# Patient Record
Sex: FEMALE | Race: WHITE | NOT HISPANIC OR LATINO
[De-identification: names, ages, dates, MRNs, and addresses within clinical notes are randomized per-mention and may not be internally consistent; named-entity substitution may affect disease eponyms.]

---

## 2017-07-24 ENCOUNTER — APPOINTMENT (OUTPATIENT)
Dept: DERMATOLOGY | Facility: CLINIC | Age: 57
End: 2017-07-24

## 2017-07-24 VITALS — SYSTOLIC BLOOD PRESSURE: 110 MMHG | DIASTOLIC BLOOD PRESSURE: 76 MMHG

## 2017-07-24 DIAGNOSIS — L82.1 OTHER SEBORRHEIC KERATOSIS: ICD-10-CM

## 2017-07-24 DIAGNOSIS — Z12.83 ENCOUNTER FOR SCREENING FOR MALIGNANT NEOPLASM OF SKIN: ICD-10-CM

## 2017-07-24 DIAGNOSIS — L57.0 ACTINIC KERATOSIS: ICD-10-CM

## 2017-07-24 DIAGNOSIS — L30.0 NUMMULAR DERMATITIS: ICD-10-CM

## 2017-07-24 DIAGNOSIS — L81.4 OTHER MELANIN HYPERPIGMENTATION: ICD-10-CM

## 2017-07-24 RX ORDER — CLOBETASOL PROPIONATE 0.5 MG/G
0.05 OINTMENT TOPICAL TWICE DAILY
Qty: 1 | Refills: 0 | Status: ACTIVE | COMMUNITY
Start: 2017-07-24 | End: 1900-01-01

## 2018-03-16 ENCOUNTER — OUTPATIENT (OUTPATIENT)
Dept: OUTPATIENT SERVICES | Facility: HOSPITAL | Age: 58
LOS: 1 days | End: 2018-03-16
Payer: COMMERCIAL

## 2018-03-16 ENCOUNTER — APPOINTMENT (OUTPATIENT)
Dept: MAMMOGRAPHY | Facility: HOSPITAL | Age: 58
End: 2018-03-16
Payer: COMMERCIAL

## 2018-03-16 ENCOUNTER — APPOINTMENT (OUTPATIENT)
Dept: ULTRASOUND IMAGING | Facility: HOSPITAL | Age: 58
End: 2018-03-16
Payer: COMMERCIAL

## 2018-03-16 DIAGNOSIS — Z00.8 ENCOUNTER FOR OTHER GENERAL EXAMINATION: ICD-10-CM

## 2018-03-16 PROCEDURE — 76641 ULTRASOUND BREAST COMPLETE: CPT

## 2018-03-16 PROCEDURE — 77063 BREAST TOMOSYNTHESIS BI: CPT | Mod: 26

## 2018-03-16 PROCEDURE — 77067 SCR MAMMO BI INCL CAD: CPT | Mod: 26

## 2018-03-16 PROCEDURE — 77067 SCR MAMMO BI INCL CAD: CPT

## 2018-03-16 PROCEDURE — 77063 BREAST TOMOSYNTHESIS BI: CPT

## 2018-03-16 PROCEDURE — 76641 ULTRASOUND BREAST COMPLETE: CPT | Mod: 26

## 2019-04-19 ENCOUNTER — OUTPATIENT (OUTPATIENT)
Dept: OUTPATIENT SERVICES | Facility: HOSPITAL | Age: 59
LOS: 1 days | End: 2019-04-19
Payer: COMMERCIAL

## 2019-04-19 ENCOUNTER — APPOINTMENT (OUTPATIENT)
Dept: ULTRASOUND IMAGING | Facility: CLINIC | Age: 59
End: 2019-04-19
Payer: COMMERCIAL

## 2019-04-19 ENCOUNTER — APPOINTMENT (OUTPATIENT)
Dept: MAMMOGRAPHY | Facility: CLINIC | Age: 59
End: 2019-04-19
Payer: COMMERCIAL

## 2019-04-19 DIAGNOSIS — Z00.8 ENCOUNTER FOR OTHER GENERAL EXAMINATION: ICD-10-CM

## 2019-04-19 PROCEDURE — 77067 SCR MAMMO BI INCL CAD: CPT | Mod: 26

## 2019-04-19 PROCEDURE — 76641 ULTRASOUND BREAST COMPLETE: CPT | Mod: 26,50

## 2019-04-19 PROCEDURE — 76641 ULTRASOUND BREAST COMPLETE: CPT

## 2019-04-19 PROCEDURE — 77063 BREAST TOMOSYNTHESIS BI: CPT | Mod: 26

## 2019-04-19 PROCEDURE — 77067 SCR MAMMO BI INCL CAD: CPT

## 2019-04-19 PROCEDURE — 77063 BREAST TOMOSYNTHESIS BI: CPT

## 2019-11-04 ENCOUNTER — OUTPATIENT (OUTPATIENT)
Dept: OUTPATIENT SERVICES | Facility: HOSPITAL | Age: 59
LOS: 1 days | End: 2019-11-04
Payer: COMMERCIAL

## 2019-11-04 ENCOUNTER — APPOINTMENT (OUTPATIENT)
Dept: MRI IMAGING | Facility: CLINIC | Age: 59
End: 2019-11-04
Payer: COMMERCIAL

## 2019-11-04 DIAGNOSIS — Z00.8 ENCOUNTER FOR OTHER GENERAL EXAMINATION: ICD-10-CM

## 2019-11-04 PROCEDURE — 77049 MRI BREAST C-+ W/CAD BI: CPT | Mod: 26

## 2019-11-04 PROCEDURE — C8908: CPT

## 2019-11-04 PROCEDURE — C8937: CPT

## 2019-11-04 PROCEDURE — A9585: CPT

## 2019-12-30 ENCOUNTER — INPATIENT (INPATIENT)
Facility: HOSPITAL | Age: 59
LOS: 0 days | Discharge: HOME | DRG: 918 | End: 2019-12-31
Attending: HOSPITALIST | Admitting: HOSPITALIST
Payer: COMMERCIAL

## 2019-12-30 VITALS
WEIGHT: 160.06 LBS | TEMPERATURE: 98 F | OXYGEN SATURATION: 99 % | HEART RATE: 103 BPM | SYSTOLIC BLOOD PRESSURE: 166 MMHG | RESPIRATION RATE: 17 BRPM | HEIGHT: 65 IN | DIASTOLIC BLOOD PRESSURE: 85 MMHG

## 2019-12-30 DIAGNOSIS — Z98.891 HISTORY OF UTERINE SCAR FROM PREVIOUS SURGERY: Chronic | ICD-10-CM

## 2019-12-30 DIAGNOSIS — Z90.49 ACQUIRED ABSENCE OF OTHER SPECIFIED PARTS OF DIGESTIVE TRACT: Chronic | ICD-10-CM

## 2019-12-30 DIAGNOSIS — G45.9 TRANSIENT CEREBRAL ISCHEMIC ATTACK, UNSPECIFIED: ICD-10-CM

## 2019-12-30 LAB
ALBUMIN SERPL ELPH-MCNC: 3.5 G/DL — SIGNIFICANT CHANGE UP (ref 3.3–5)
ALP SERPL-CCNC: 108 U/L — SIGNIFICANT CHANGE UP (ref 40–120)
ALT FLD-CCNC: 85 U/L — HIGH (ref 10–45)
ANION GAP SERPL CALC-SCNC: 11 MMOL/L — SIGNIFICANT CHANGE UP (ref 5–17)
APTT BLD: 32 SEC — SIGNIFICANT CHANGE UP (ref 27.5–36.3)
AST SERPL-CCNC: 59 U/L — HIGH (ref 10–40)
BASOPHILS # BLD AUTO: 0.07 K/UL — SIGNIFICANT CHANGE UP (ref 0–0.2)
BASOPHILS NFR BLD AUTO: 0.9 % — SIGNIFICANT CHANGE UP (ref 0–2)
BILIRUB SERPL-MCNC: 0.3 MG/DL — SIGNIFICANT CHANGE UP (ref 0.2–1.2)
BLOOD GAS SOURCE: SIGNIFICANT CHANGE UP
BUN SERPL-MCNC: 18 MG/DL — SIGNIFICANT CHANGE UP (ref 7–23)
CALCIUM SERPL-MCNC: 9.1 MG/DL — SIGNIFICANT CHANGE UP (ref 8.4–10.5)
CHLORIDE SERPL-SCNC: 105 MMOL/L — SIGNIFICANT CHANGE UP (ref 96–108)
CO2 SERPL-SCNC: 24 MMOL/L — SIGNIFICANT CHANGE UP (ref 22–31)
COHGB MFR BLDV: 3 % — HIGH (ref 0–2)
CREAT SERPL-MCNC: 1 MG/DL — SIGNIFICANT CHANGE UP (ref 0.5–1.3)
EOSINOPHIL # BLD AUTO: 0.25 K/UL — SIGNIFICANT CHANGE UP (ref 0–0.5)
EOSINOPHIL NFR BLD AUTO: 3.3 % — SIGNIFICANT CHANGE UP (ref 0–6)
GLUCOSE SERPL-MCNC: 115 MG/DL — HIGH (ref 70–99)
HCT VFR BLD CALC: 42.1 % — SIGNIFICANT CHANGE UP (ref 34.5–45)
HGB BLD-MCNC: 13.7 G/DL — SIGNIFICANT CHANGE UP (ref 11.5–15.5)
IMM GRANULOCYTES NFR BLD AUTO: 0.4 % — SIGNIFICANT CHANGE UP (ref 0–1.5)
INR BLD: 1.12 RATIO — SIGNIFICANT CHANGE UP (ref 0.88–1.16)
LYMPHOCYTES # BLD AUTO: 1.83 K/UL — SIGNIFICANT CHANGE UP (ref 1–3.3)
LYMPHOCYTES # BLD AUTO: 24.4 % — SIGNIFICANT CHANGE UP (ref 13–44)
MCHC RBC-ENTMCNC: 28.5 PG — SIGNIFICANT CHANGE UP (ref 27–34)
MCHC RBC-ENTMCNC: 32.5 GM/DL — SIGNIFICANT CHANGE UP (ref 32–36)
MCV RBC AUTO: 87.7 FL — SIGNIFICANT CHANGE UP (ref 80–100)
MONOCYTES # BLD AUTO: 0.54 K/UL — SIGNIFICANT CHANGE UP (ref 0–0.9)
MONOCYTES NFR BLD AUTO: 7.2 % — SIGNIFICANT CHANGE UP (ref 2–14)
NEUTROPHILS # BLD AUTO: 4.79 K/UL — SIGNIFICANT CHANGE UP (ref 1.8–7.4)
NEUTROPHILS NFR BLD AUTO: 63.8 % — SIGNIFICANT CHANGE UP (ref 43–77)
NRBC # BLD: 0 /100 WBCS — SIGNIFICANT CHANGE UP (ref 0–0)
PLATELET # BLD AUTO: 286 K/UL — SIGNIFICANT CHANGE UP (ref 150–400)
POTASSIUM SERPL-MCNC: 4.3 MMOL/L — SIGNIFICANT CHANGE UP (ref 3.5–5.3)
POTASSIUM SERPL-SCNC: 4.3 MMOL/L — SIGNIFICANT CHANGE UP (ref 3.5–5.3)
PROT SERPL-MCNC: 7.7 G/DL — SIGNIFICANT CHANGE UP (ref 6–8.3)
PROTHROM AB SERPL-ACNC: 12.6 SEC — SIGNIFICANT CHANGE UP (ref 10–12.9)
RBC # BLD: 4.8 M/UL — SIGNIFICANT CHANGE UP (ref 3.8–5.2)
RBC # FLD: 13.3 % — SIGNIFICANT CHANGE UP (ref 10.3–14.5)
SODIUM SERPL-SCNC: 140 MMOL/L — SIGNIFICANT CHANGE UP (ref 135–145)
TROPONIN I SERPL-MCNC: <.017 NG/ML — LOW (ref 0.02–0.06)
WBC # BLD: 7.51 K/UL — SIGNIFICANT CHANGE UP (ref 3.8–10.5)
WBC # FLD AUTO: 7.51 K/UL — SIGNIFICANT CHANGE UP (ref 3.8–10.5)

## 2019-12-30 PROCEDURE — 93010 ELECTROCARDIOGRAM REPORT: CPT

## 2019-12-30 PROCEDURE — 99285 EMERGENCY DEPT VISIT HI MDM: CPT

## 2019-12-30 PROCEDURE — 99223 1ST HOSP IP/OBS HIGH 75: CPT

## 2019-12-30 PROCEDURE — 70450 CT HEAD/BRAIN W/O DYE: CPT | Mod: 26

## 2019-12-30 PROCEDURE — 93880 EXTRACRANIAL BILAT STUDY: CPT | Mod: 26

## 2019-12-30 PROCEDURE — 71045 X-RAY EXAM CHEST 1 VIEW: CPT | Mod: 26

## 2019-12-30 RX ORDER — ASPIRIN/CALCIUM CARB/MAGNESIUM 324 MG
81 TABLET ORAL DAILY
Refills: 0 | Status: DISCONTINUED | OUTPATIENT
Start: 2019-12-30 | End: 2019-12-31

## 2019-12-30 RX ORDER — ASPIRIN/CALCIUM CARB/MAGNESIUM 324 MG
325 TABLET ORAL ONCE
Refills: 0 | Status: COMPLETED | OUTPATIENT
Start: 2019-12-30 | End: 2019-12-30

## 2019-12-30 RX ORDER — SODIUM CHLORIDE 9 MG/ML
1000 INJECTION INTRAMUSCULAR; INTRAVENOUS; SUBCUTANEOUS ONCE
Refills: 0 | Status: COMPLETED | OUTPATIENT
Start: 2019-12-30 | End: 2019-12-30

## 2019-12-30 RX ORDER — ATORVASTATIN CALCIUM 80 MG/1
80 TABLET, FILM COATED ORAL AT BEDTIME
Refills: 0 | Status: DISCONTINUED | OUTPATIENT
Start: 2019-12-30 | End: 2019-12-31

## 2019-12-30 RX ADMIN — SODIUM CHLORIDE 1000 MILLILITER(S): 9 INJECTION INTRAMUSCULAR; INTRAVENOUS; SUBCUTANEOUS at 14:10

## 2019-12-30 RX ADMIN — SODIUM CHLORIDE 1000 MILLILITER(S): 9 INJECTION INTRAMUSCULAR; INTRAVENOUS; SUBCUTANEOUS at 15:10

## 2019-12-30 RX ADMIN — Medication 325 MILLIGRAM(S): at 15:51

## 2019-12-30 RX ADMIN — ATORVASTATIN CALCIUM 80 MILLIGRAM(S): 80 TABLET, FILM COATED ORAL at 21:46

## 2019-12-30 NOTE — H&P ADULT - NSHPREVIEWOFSYSTEMS_GEN_ALL_CORE
REVIEW OF SYSTEMS:  CONSTITUTIONAL: No fever, weight loss, or fatigue  EYES: No eye pain, visual disturbances, or discharge  ENMT:  No difficulty hearing, tinnitus, vertigo; No sinus or throat pain  NECK: No neck pain or neck stiffness  BREASTS: No pain, masses, or nipple discharge  RESPIRATORY: No cough, wheezing, chills or hemoptysis; No shortness of breath  CARDIOVASCULAR: No chest pain, palpitations, dizziness, or leg swelling  GASTROINTESTINAL: No abdominal pain, No nausea, vomiting, or hematemesis; No diarrhea or constipation  GENITOURINARY: No dysuria, frequency, hematuria, or incontinence  NEUROLOGICAL: +"Dreamlike", + difficulty concentrating. +Tingling in fingers. No headaches, memory loss, loss of strength, numbness, or tremors  SKIN: No itching, burning, rashes, or lesions   LYMPH NODES: No enlarged glands  ENDOCRINE: No heat or cold intolerance; No hair loss  MUSCULOSKELETAL: No joint pain or swelling; No muscle, back, or extremity pain  PSYCHIATRIC: No depression, anxiety, mood swings, or difficulty sleeping  HEME/LYMPH: No easy bruising or bleeding  ALLERGY AND IMMUNOLOGIC: No hives or eczema    All other ROS reviewed and negative except as otherwise stated

## 2019-12-30 NOTE — H&P ADULT - ATTENDING COMMENTS
I have personally seen and examined patient on the above date.  I discussed the case with GAMALIEL Price and I agree with findings and plan as detailed per note above, which I have amended where appropriate.

## 2019-12-30 NOTE — H&P ADULT - NSHPPHYSICALEXAM_GEN_ALL_CORE
PHYSICAL EXAM:  GENERAL: NAD, well-groomed, well-developed  HEAD:  Atraumatic, Normocephalic  EYES: EOMI, conjunctiva and sclera clear  ENMT: Moist mucous membranes, Good dentition, no thrush  NECK: Supple, No JVD  CHEST/LUNG: Clear to auscultation bilaterally, good air entry, non-labored breathing  HEART: RRR; S1/S2, No murmur  ABDOMEN: Soft, Nontender, Nondistended; Bowel sounds present  VASCULAR: Normal pulses, Normal capillary refill  EXTREMITIES: No calf tenderness, No cyanosis, No edema  SKIN: Warm, Intact  NERVOUS SYSTEM:  A/O x3, Good concentration; CN 2-12 intact, No focal deficits

## 2019-12-30 NOTE — ED PROVIDER NOTE - PROGRESS NOTE DETAILS
PROSPER Orr: Spoke with Dr. Veronica, she recommended ASA and lipitor. Will give a stat does of lipitor now

## 2019-12-30 NOTE — H&P ADULT - NSHPLABSRESULTS_GEN_ALL_CORE
13.7   7.51  )-----------( 286      ( 30 Dec 2019 13:50 )             42.1     12-30    140  |  105  |  18  ----------------------------<  115<H>  4.3   |  24  |  1.00    Ca    9.1      30 Dec 2019 13:23    TPro  7.7  /  Alb  3.5  /  TBili  0.3  /  DBili  x   /  AST  59<H>  /  ALT  85<H>  /  AlkPhos  108  12-30      < from: CT Brain Stroke Protocol (12.30.19 @ 13:36) >      Impression:  1. Unremarkable noncontrast CT scan of the brain.    < end of copied text >    < from: Xray Chest 1 View AP/PA (12.30.19 @ 15:42) >      IMPRESSION:    No active disease.    < end of copied text >

## 2019-12-30 NOTE — ED PROVIDER NOTE - CLINICAL SUMMARY MEDICAL DECISION MAKING FREE TEXT BOX
58 y/o F with PMH of HTN, HLD was BIB EMS for c/o lightheadedness, and tingling to b/l fingers ~1 hour PTA. 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 Reports she feels like she has to "concentrate" more to get her thoughts out. She denies HA, visual changes, extremity weakness, f/c, n/v/d, paresthesias, motor/sensory deficits or all other complaints. PE as noted above. NIHSS is 0. labs reviewed, carboxyhemoglobin is mildly elevated, patient placed on  NC oxygen. imaging pending, patient states she feels better 58 y/o F with PMH of HTN, HLD was BIB EMS for c/o lightheadedness, and tingling to b/l fingers ~1 hour PTA. 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 Reports she feels like she has to "concentrate" more to get her thoughts out. She denies HA, visual changes, extremity weakness, f/c, n/v/d, paresthesias, motor/sensory deficits or all other complaints. PE as noted above. NIHSS is 0. labs reviewed, carboxyhemoglobin is mildly elevated, patient placed on NC oxygen. imaging negative, patient states she feels better. Neuro paged multiple times. Patient will need admission for TIA 58 y/o F with PMH of HTN, HLD was BIB EMS for c/o lightheadedness, and tingling to b/l fingers ~1 hour PTA. 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 Reports she feels like she has to "concentrate" more to get her thoughts out. She denies HA, visual changes, extremity weakness, f/c, n/v/d, paresthesias, motor/sensory deficits or all other complaints. PE as noted above. NIHSS is 0. labs reviewed, carboxyhemoglobin is mildly elevated, patient placed on NC oxygen. imaging negative, patient states she feels better. Neuro paged multiple times. Patient will need admission for TIA, spoke with hospitalist

## 2019-12-30 NOTE — ED ADULT NURSE NOTE - OBJECTIVE STATEMENT
Pt presents to ED with c/o dizziness and numbness in hands and feet.  at bedside states that starting at 1200 patient became increasingly confused, but has since improved. Pt now A&Ox3, speech clear but slow to recall information. Pt presents to ED with c/o dizziness and numbness in hands and feet.  at bedside states that starting at 1200 patient became increasingly confused, but has since improved. Pt now A&Ox3, speech clear but slow to recall information.  Pt started lipitor 40mg about 3 weeks ago.

## 2019-12-30 NOTE — ED PROVIDER NOTE - CARE PLAN
Principal Discharge DX:	TIA (transient ischemic attack)  Secondary Diagnosis:	Carboxyhemoglobinemia, accidental or unintentional, initial encounter

## 2019-12-30 NOTE — ED ADULT NURSE REASSESSMENT NOTE - NS ED NURSE REASSESS COMMENT FT1
Pt walked to bathroom with observation, steady gait. Pt feels like her mentation is improving. Numbness has resolved.

## 2019-12-30 NOTE — ED ADULT TRIAGE NOTE - CHIEF COMPLAINT QUOTE
Pt BIBA from home, c/o feeling lightheaded/dizzy and stated her fingers from both hands feel numb started about 1 hour ago

## 2019-12-30 NOTE — ED PROVIDER NOTE - ATTENDING CONTRIBUTION TO CARE
58 y/o F with PMH of HTN, HLD was BIB EMS for c/o lightheadedness, and tingling to b/l fingers ~1 hour PTA. 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 Reports she feels like she has to "concentrate" more to get her thoughts out. She denies HA, visual changes, extremity weakness, f/c, n/v/d, paresthesias, motor/sensory deficits or all other complaints. PE as noted above. NIHSS is 0. labs reviewed, carboxyhemoglobin is mildly elevated, patient placed on NC oxygen. imaging negative, patient states she feels better. Neuro paged multiple times. Patient will need admission for TIA, spoke with hospitalist  Dr. Rod:  I have reviewed and discussed with the PA/ resident the case specifics, including the history, physical assessment, evaluation, conclusion, laboratory results, and medical plan. I agree with the contents, and conclusions. I have personally examined, and interviewed the patient.

## 2019-12-30 NOTE — ED PROVIDER NOTE - OBJECTIVE STATEMENT
60 y/o 58 y/o F with PMH of HTN, HLD was BIB EMS for c/o lightheadedness, and tingling to b/l fingers ~1 hour PTA. 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 Reports she feels like she has to "concentrate" more to get her thoughts out. She denies HA, visual changes, extremity weakness, f/c, n/v/d, paresthesias, motor/sensory deficits or all other complaints

## 2019-12-30 NOTE — H&P ADULT - ASSESSMENT
60 yo female with PMH HLD presents with sudden onset "dreamlike" state, difficulty concentrating, nausea, and tingling in fingers which have resolved.    #Suspected TIA  - CT brain stroke protocol - unremarkable  - NIHSS score = 0  - Trop negative x1  - Will initiate ASA 81 daily  - Will lipitor 80 mg daily (on 40mg daily at home) - will titrate based on lipid profile  - Lipid profile, A1C  - telemetry monitoring to r/o arrhythmia   - Repeat CT head if any further changes in neuro status  - Echocardiagram  - Neurology consult -- already contacted by ED    #Elevated carboxyhemoglobin   - Mild elevation   - Maintain O2 via nasal cannula for now    #DVT ppx -- encourage ambulation    CAPRINI SCORE [CLOT]    AGE RELATED RISK FACTORS                                                           MOBILITY RELATED FACTORS  [x ] Age 41-60 years                                            (1 Point)                 [ ] Bed rest                                                        (1 Point)  [ ] Age: 61-74 years                                           (2 Points)               [ ] Plaster cast                                                   (2 Points)  [ ] Age= 75 years                                                (3 Points)                 [ ] Bed bound for more than 72 hours          (2 Points)    DISEASE RELATED RISK FACTORS                                                     GENDER SPECIFIC FACTORS  [ ] Edema in the lower extremities                 (1 Point)                   [ ] Pregnancy                                                         (1 Point)  [ ] Varicose veins                                               (1 Point)                    [ ] Post-partum < 6 weeks                                   (1 Point)             [ ] BMI > 25 Kg/m2                                            (1 Point)                    [ ] Hormonal therapy  or oral contraception   (1 Point)                 [ ] Sepsis (in the previous month)                   (1 Point)                   [ ] History of pregnancy complications              (1 point)  [ ] Pneumonia or serious lung disease                                              [ ] Unexplained or recurrent                (1 Point)           (in the previous month)                               (1 Point)  [ ] Abnormal pulmonary function test                     (1 Point)            SURGERY RELATED RISK FACTORS  [ ] Acute myocardial infarction                              (1 Point)                [ ]  Section                                             (1 Point)  [ ] Congestive heart failure (in the previous month)  (1 Point)       [ ] Minor surgery                                                  (1 Point)   [ ] Inflammatory bowel disease                             (1 Point)                [ ] Arthroscopic surgery                                        (2 Points)  [ ] Central venous access                                      (2 Points)                [ ] General surgery lasting more than 45 minutes   (2 Points)       [ ] Stroke (in the previous month)                          (5 Points)             [ ] Elective arthroplasty                                         (5 Points)                                                                                                                                               HEMATOLOGY RELATED FACTORS                                                       TRAUMA RELATED RISK FACTORS  [ ] Prior episodes of VTE                                     (3 Points)                   [ ] Fracture of the hip, pelvis, or leg                       (5 Points)  [ ] Positive family history for VTE                         (3 Points)                [ ] Acute spinal cord injury (in the previous month)  (5 Points)  [ ] Prothrombin 63167 A                                     (3 Points)                   [ ] Paralysis  (less than 1 month)                             (5 Points)  [ ] Factor V Leiden                                             (3 Points)                      [ ] Multiple Trauma within 1 month                        (5 Points)  [ ] Lupus anticoagulants                                     (3 Points)                                                           [ ] Anticardiolipin antibodies                               (3 Points)                                                       [ ] High homocysteine in the blood                      (3 Points)                                             [ ] Other congenital or acquired thrombophilia      (3 Points)                                                [ ] Heparin induced thrombocytopenia                  (3 Points)                                        Total Score [      1    ] 58 yo female with PMH HLD presents with sudden onset "dreamlike" state, difficulty concentrating, nausea, and tingling in fingers which have resolved.    #Suspected TIA  - CT brain stroke protocol - unremarkable  - NIHSS score = 0  - Trop negative x1  - Will initiate ASA 81 daily  - Will lipitor 80 mg daily (on 40mg daily at home) - will titrate based on lipid profile  - Lipid profile, A1C  - telemetry monitoring to r/o arrhythmia   - Repeat CT head if any further changes in neuro status  - Echocardiagram, carotid ultrasound   - Neurology consult -- already contacted by ED    #Elevated carboxyhemoglobin   - Mild elevation   - Maintain O2 via nasal cannula for now    #DVT ppx -- encourage ambulation    CAPRINI SCORE [CLOT]    AGE RELATED RISK FACTORS                                                           MOBILITY RELATED FACTORS  [x ] Age 41-60 years                                            (1 Point)                 [ ] Bed rest                                                        (1 Point)  [ ] Age: 61-74 years                                           (2 Points)               [ ] Plaster cast                                                   (2 Points)  [ ] Age= 75 years                                                (3 Points)                 [ ] Bed bound for more than 72 hours          (2 Points)    DISEASE RELATED RISK FACTORS                                                     GENDER SPECIFIC FACTORS  [ ] Edema in the lower extremities                 (1 Point)                   [ ] Pregnancy                                                         (1 Point)  [ ] Varicose veins                                               (1 Point)                    [ ] Post-partum < 6 weeks                                   (1 Point)             [ ] BMI > 25 Kg/m2                                            (1 Point)                    [ ] Hormonal therapy  or oral contraception   (1 Point)                 [ ] Sepsis (in the previous month)                   (1 Point)                   [ ] History of pregnancy complications              (1 point)  [ ] Pneumonia or serious lung disease                                              [ ] Unexplained or recurrent                (1 Point)           (in the previous month)                               (1 Point)  [ ] Abnormal pulmonary function test                     (1 Point)            SURGERY RELATED RISK FACTORS  [ ] Acute myocardial infarction                              (1 Point)                [ ]  Section                                             (1 Point)  [ ] Congestive heart failure (in the previous month)  (1 Point)       [ ] Minor surgery                                                  (1 Point)   [ ] Inflammatory bowel disease                             (1 Point)                [ ] Arthroscopic surgery                                        (2 Points)  [ ] Central venous access                                      (2 Points)                [ ] General surgery lasting more than 45 minutes   (2 Points)       [ ] Stroke (in the previous month)                          (5 Points)             [ ] Elective arthroplasty                                         (5 Points)                                                                                                                                               HEMATOLOGY RELATED FACTORS                                                       TRAUMA RELATED RISK FACTORS  [ ] Prior episodes of VTE                                     (3 Points)                   [ ] Fracture of the hip, pelvis, or leg                       (5 Points)  [ ] Positive family history for VTE                         (3 Points)                [ ] Acute spinal cord injury (in the previous month)  (5 Points)  [ ] Prothrombin 12502 A                                     (3 Points)                   [ ] Paralysis  (less than 1 month)                             (5 Points)  [ ] Factor V Leiden                                             (3 Points)                      [ ] Multiple Trauma within 1 month                        (5 Points)  [ ] Lupus anticoagulants                                     (3 Points)                                                           [ ] Anticardiolipin antibodies                               (3 Points)                                                       [ ] High homocysteine in the blood                      (3 Points)                                             [ ] Other congenital or acquired thrombophilia      (3 Points)                                                [ ] Heparin induced thrombocytopenia                  (3 Points)                                        Total Score [      1    ]

## 2019-12-30 NOTE — H&P ADULT - HISTORY OF PRESENT ILLNESS
60 yo female with PMH HLD presents with sudden onset lightheadedness ("dreamlike" sensation), nausea, and tingling in fingers around 12:15 this afternoon. Symptoms began a few minutes after patient inhaled fumes from her basement where work is being done. She says that she opened her basement door and inhaled large whiff of fumes, she was not in basement or breathing in fumes for prolonged period of time. Three minutes later patient felt lighteaded/dreamlike, tingling in fingers, nausea, and difficulty concentrating. After about 15 minutes of persistent symptoms  called EMS, since then symptoms have gradually improved and are now resolved. Patient denies feeling like she needed to pass out at time of event. She denies chest pain, palpitations, vomiting, slurred speech. Currently patient is asymptomatic.     PMH HLD-- started on lipitor 3 weeks ago. Never formally diagnosed with HTN, on no medications. Nonsmoker.

## 2019-12-30 NOTE — ED ADULT NURSE NOTE - CHPI ED NUR SYMPTOMS NEG
no weakness/no loss of consciousness/no change in level of consciousness/no fever/no vomiting/no nausea/no blurred vision

## 2019-12-31 ENCOUNTER — TRANSCRIPTION ENCOUNTER (OUTPATIENT)
Age: 59
End: 2019-12-31

## 2019-12-31 VITALS
OXYGEN SATURATION: 99 % | SYSTOLIC BLOOD PRESSURE: 131 MMHG | RESPIRATION RATE: 16 BRPM | DIASTOLIC BLOOD PRESSURE: 61 MMHG | TEMPERATURE: 98 F | HEART RATE: 81 BPM

## 2019-12-31 DIAGNOSIS — R74.8 ABNORMAL LEVELS OF OTHER SERUM ENZYMES: ICD-10-CM

## 2019-12-31 DIAGNOSIS — E78.5 HYPERLIPIDEMIA, UNSPECIFIED: ICD-10-CM

## 2019-12-31 DIAGNOSIS — T58.91XA TOXIC EFFECT OF CARBON MONOXIDE FROM UNSPECIFIED SOURCE, ACCIDENTAL (UNINTENTIONAL), INITIAL ENCOUNTER: ICD-10-CM

## 2019-12-31 DIAGNOSIS — G45.9 TRANSIENT CEREBRAL ISCHEMIC ATTACK, UNSPECIFIED: ICD-10-CM

## 2019-12-31 LAB
ALBUMIN SERPL ELPH-MCNC: 3.1 G/DL — LOW (ref 3.3–5)
ALP SERPL-CCNC: 94 U/L — SIGNIFICANT CHANGE UP (ref 40–120)
ALT FLD-CCNC: 74 U/L — HIGH (ref 10–45)
ANION GAP SERPL CALC-SCNC: 9 MMOL/L — SIGNIFICANT CHANGE UP (ref 5–17)
AST SERPL-CCNC: 42 U/L — HIGH (ref 10–40)
BILIRUB SERPL-MCNC: 0.3 MG/DL — SIGNIFICANT CHANGE UP (ref 0.2–1.2)
BUN SERPL-MCNC: 5 MG/DL — LOW (ref 7–23)
CALCIUM SERPL-MCNC: 7.9 MG/DL — LOW (ref 8.4–10.5)
CHLORIDE SERPL-SCNC: 108 MMOL/L — SIGNIFICANT CHANGE UP (ref 96–108)
CHOLEST SERPL-MCNC: 192 MG/DL — SIGNIFICANT CHANGE UP (ref 10–199)
CO2 SERPL-SCNC: 26 MMOL/L — SIGNIFICANT CHANGE UP (ref 22–31)
CREAT SERPL-MCNC: 0.81 MG/DL — SIGNIFICANT CHANGE UP (ref 0.5–1.3)
GLUCOSE SERPL-MCNC: 92 MG/DL — SIGNIFICANT CHANGE UP (ref 70–99)
HBA1C BLD-MCNC: 5.6 % — SIGNIFICANT CHANGE UP (ref 4–5.6)
HCT VFR BLD CALC: 37.7 % — SIGNIFICANT CHANGE UP (ref 34.5–45)
HCV AB S/CO SERPL IA: 0.11 S/CO — SIGNIFICANT CHANGE UP (ref 0–0.99)
HCV AB SERPL-IMP: SIGNIFICANT CHANGE UP
HDLC SERPL-MCNC: 61 MG/DL — SIGNIFICANT CHANGE UP
HGB BLD-MCNC: 12.3 G/DL — SIGNIFICANT CHANGE UP (ref 11.5–15.5)
LIPID PNL WITH DIRECT LDL SERPL: 112 MG/DL — HIGH
MCHC RBC-ENTMCNC: 28.7 PG — SIGNIFICANT CHANGE UP (ref 27–34)
MCHC RBC-ENTMCNC: 32.6 GM/DL — SIGNIFICANT CHANGE UP (ref 32–36)
MCV RBC AUTO: 87.9 FL — SIGNIFICANT CHANGE UP (ref 80–100)
NRBC # BLD: 0 /100 WBCS — SIGNIFICANT CHANGE UP (ref 0–0)
PLATELET # BLD AUTO: 253 K/UL — SIGNIFICANT CHANGE UP (ref 150–400)
POTASSIUM SERPL-MCNC: 4 MMOL/L — SIGNIFICANT CHANGE UP (ref 3.5–5.3)
POTASSIUM SERPL-SCNC: 4 MMOL/L — SIGNIFICANT CHANGE UP (ref 3.5–5.3)
PROT SERPL-MCNC: 6.7 G/DL — SIGNIFICANT CHANGE UP (ref 6–8.3)
RBC # BLD: 4.29 M/UL — SIGNIFICANT CHANGE UP (ref 3.8–5.2)
RBC # FLD: 13.5 % — SIGNIFICANT CHANGE UP (ref 10.3–14.5)
SODIUM SERPL-SCNC: 143 MMOL/L — SIGNIFICANT CHANGE UP (ref 135–145)
TOTAL CHOLESTEROL/HDL RATIO MEASUREMENT: 3.2 RATIO — LOW (ref 3.3–7.1)
TRIGL SERPL-MCNC: 95 MG/DL — SIGNIFICANT CHANGE UP (ref 10–149)
WBC # BLD: 9.82 K/UL — SIGNIFICANT CHANGE UP (ref 3.8–10.5)
WBC # FLD AUTO: 9.82 K/UL — SIGNIFICANT CHANGE UP (ref 3.8–10.5)

## 2019-12-31 PROCEDURE — 99285 EMERGENCY DEPT VISIT HI MDM: CPT | Mod: 25

## 2019-12-31 PROCEDURE — 99254 IP/OBS CNSLTJ NEW/EST MOD 60: CPT

## 2019-12-31 PROCEDURE — 85027 COMPLETE CBC AUTOMATED: CPT

## 2019-12-31 PROCEDURE — 80053 COMPREHEN METABOLIC PANEL: CPT

## 2019-12-31 PROCEDURE — 71045 X-RAY EXAM CHEST 1 VIEW: CPT

## 2019-12-31 PROCEDURE — 86803 HEPATITIS C AB TEST: CPT

## 2019-12-31 PROCEDURE — 80061 LIPID PANEL: CPT

## 2019-12-31 PROCEDURE — 93005 ELECTROCARDIOGRAM TRACING: CPT

## 2019-12-31 PROCEDURE — G0378: CPT

## 2019-12-31 PROCEDURE — 70450 CT HEAD/BRAIN W/O DYE: CPT

## 2019-12-31 PROCEDURE — 82375 ASSAY CARBOXYHB QUANT: CPT

## 2019-12-31 PROCEDURE — 93306 TTE W/DOPPLER COMPLETE: CPT

## 2019-12-31 PROCEDURE — 83036 HEMOGLOBIN GLYCOSYLATED A1C: CPT

## 2019-12-31 PROCEDURE — 70450 CT HEAD/BRAIN W/O DYE: CPT | Mod: 26

## 2019-12-31 PROCEDURE — 96360 HYDRATION IV INFUSION INIT: CPT

## 2019-12-31 PROCEDURE — 99239 HOSP IP/OBS DSCHRG MGMT >30: CPT | Mod: GC

## 2019-12-31 PROCEDURE — 93306 TTE W/DOPPLER COMPLETE: CPT | Mod: 26

## 2019-12-31 PROCEDURE — 85730 THROMBOPLASTIN TIME PARTIAL: CPT

## 2019-12-31 PROCEDURE — 84484 ASSAY OF TROPONIN QUANT: CPT

## 2019-12-31 PROCEDURE — 85610 PROTHROMBIN TIME: CPT

## 2019-12-31 PROCEDURE — 36415 COLL VENOUS BLD VENIPUNCTURE: CPT

## 2019-12-31 PROCEDURE — 93880 EXTRACRANIAL BILAT STUDY: CPT

## 2019-12-31 PROCEDURE — 82962 GLUCOSE BLOOD TEST: CPT

## 2019-12-31 RX ORDER — ATORVASTATIN CALCIUM 80 MG/1
1 TABLET, FILM COATED ORAL
Qty: 0 | Refills: 0 | DISCHARGE

## 2019-12-31 RX ORDER — ASPIRIN/CALCIUM CARB/MAGNESIUM 324 MG
1 TABLET ORAL
Qty: 30 | Refills: 0
Start: 2019-12-31 | End: 2020-01-29

## 2019-12-31 RX ORDER — ATORVASTATIN CALCIUM 80 MG/1
1 TABLET, FILM COATED ORAL
Qty: 30 | Refills: 0
Start: 2019-12-31 | End: 2020-01-29

## 2019-12-31 RX ADMIN — Medication 81 MILLIGRAM(S): at 12:28

## 2019-12-31 NOTE — CONSULT NOTE ADULT - SUBJECTIVE AND OBJECTIVE BOX
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 She state she fumes was intense and she felt lightheaded as if she's in a dream like state. She felt tingling all over her body. She had no prior episodes.     Vital Signs Last 24 Hrs  T(C): 36.6 (31 Dec 2019 05:15), Max: 36.8 (30 Dec 2019 20:45)  T(F): 97.9 (31 Dec 2019 05:15), Max: 98.3 (30 Dec 2019 20:45)  HR: 79 (31 Dec 2019 05:15) (75 - 103)  BP: 121/61 (31 Dec 2019 05:15) (121/61 - 166/85)  BP(mean): 80 (30 Dec 2019 16:00) (80 - 80)  RR: 16 (31 Dec 2019 05:15) (16 - 18)  SpO2: 98% (31 Dec 2019 05:15) (98% - 100%)    AAOX 3 speech fluent lang intact  perrl, eomi, face sym  moves all 4 ext against gravity  sensory intact lt    59 W who presents with dream like state after inhaling strong fumes. Less likely tia. Repeat HCT today. D/w PROSPER Morris

## 2019-12-31 NOTE — PROGRESS NOTE ADULT - ATTENDING COMMENTS
Symptoms resolved, repeat head CT negative. ECHO pending, if normal can be discharged to home today.

## 2019-12-31 NOTE — PROGRESS NOTE ADULT - SUBJECTIVE AND OBJECTIVE BOX
Patient is a 59y old  Female who presents with a chief complaint of TIA (31 Dec 2019 08:53)  No acute events overnight      Patient seen and examined at bedside.    ALLERGIES:  No Known Allergies        Vital Signs Last 24 Hrs  T(F): 98.3 (31 Dec 2019 10:23), Max: 98.3 (30 Dec 2019 20:45)  HR: 81 (31 Dec 2019 10:23) (75 - 103)  BP: 131/61 (31 Dec 2019 10:23) (121/61 - 166/85)  RR: 16 (31 Dec 2019 10:23) (16 - 18)  SpO2: 99% (31 Dec 2019 10:23) (98% - 100%)  I&O's Summary    31 Dec 2019 07:01  -  31 Dec 2019 10:36  --------------------------------------------------------  IN: 250 mL / OUT: 1 mL / NET: 249 mL      MEDICATIONS:  aspirin  chewable 81 milliGRAM(s) Oral daily  atorvastatin 80 milliGRAM(s) Oral at bedtime      PHYSICAL EXAM:  General: NAD, A/O x 3  ENT: MMM  Neck: Supple, No JVD  Lungs: Clear to auscultation bilaterally, bilateral air entry  Cardio: S1S2 regular  Abdomen: Soft, Nontender  Extremities: No cyanosis, No edema    LABS:                        12.3   9.82  )-----------( 253      ( 31 Dec 2019 05:48 )             37.7     12-31    143  |  108  |  5   ----------------------------<  92  4.0   |  26  |  0.81    Ca    7.9      31 Dec 2019 05:48    TPro  6.7  /  Alb  3.1  /  TBili  0.3  /  DBili  x   /  AST  42  /  ALT  74  /  AlkPhos  94  12-31    eGFR if Non African American: 80 mL/min/1.73M2 (12-31-19 @ 05:48)  eGFR if : 92 mL/min/1.73M2 (12-31-19 @ 05:48)    PT/INR - ( 30 Dec 2019 13:23 )   PT: 12.6 sec;   INR: 1.12 ratio         PTT - ( 30 Dec 2019 13:23 )  PTT:32.0 sec    CARDIAC MARKERS ( 30 Dec 2019 13:23 )  <.017 ng/mL / x     / x     / x     / x          12-31 Chol 192 mg/dL  mg/dL HDL 61 mg/dL Trig 95 mg/dL              POCT Blood Glucose.: 148 mg/dL (30 Dec 2019 13:11)    12-31 [id number] 5.6          RADIOLOGY & ADDITIONAL TESTS:  < from: US Duplex Carotid Arteries Complete, Bilateral (12.30.19 @ 19:31) >  Impression: No hemodynamically significant stenosis in the left internal carotid artery by velocity criteria.    Elevated peak systolic velocity in the right internal carotid artery, compatible with a moderate (50-69%) stenosis by velocity criteria.                      CHAD ALBERT M.D., ATTENDING RADIOLOGIST  This document has been electronically signed. Dec 30 2019  7:48PM    < end of copied text >  < from: CT Brain Stroke Protocol (12.30.19 @ 13:36) >    Impression:  1. Unremarkable noncontrast CT scan of the brain.                        TRENTON HORNE M.D., ATTENDING RADIOLOGIST  This document has been electronically signed. Dec 30 2019  3:22PM    < end of copied text >        Care Discussed with Consultants/Other Providers: Patient is a 59y old  Female who presents with a chief complaint of TIA (31 Dec 2019 08:53)  No acute events overnight      Patient seen and examined at bedside, feels better, denies headache, nausea, vomiting, dizziness, chest pain, palpitations, abdominal pain, diarrhea or constipation    ALLERGIES:  No Known Allergies        Vital Signs Last 24 Hrs  T(F): 98.3 (31 Dec 2019 10:23), Max: 98.3 (30 Dec 2019 20:45)  HR: 81 (31 Dec 2019 10:23) (75 - 103)  BP: 131/61 (31 Dec 2019 10:23) (121/61 - 166/85)  RR: 16 (31 Dec 2019 10:23) (16 - 18)  SpO2: 99% (31 Dec 2019 10:23) (98% - 100%)  I&O's Summary    31 Dec 2019 07:01  -  31 Dec 2019 10:36  --------------------------------------------------------  IN: 250 mL / OUT: 1 mL / NET: 249 mL      MEDICATIONS:  aspirin  chewable 81 milliGRAM(s) Oral daily  atorvastatin 80 milliGRAM(s) Oral at bedtime      PHYSICAL EXAM:    Constitutional: WDWN resting comfortably in bed; NAD  Head: NC/AT  Eyes: PERRLA, EOMI, clear conjunctiva  ENT: no nasal discharge; no oropharyngeal erythema or exudates; moist oral mucosa  Neck: supple; no JVD or thyromegaly  Respiratory: CTA B/L; no W/R/R, no retractions  Cardiac: +S1/S2; RRR; no M/R/G; PMI non-displaced  Gastrointestinal: soft, NT/ND; no rebound or guarding; +BS, no hepatosplenomegaly  Extremities: WWP, no clubbing or cyanosis; no peripheral edema  Vascular: 2+ radial, DP/PT pulses B/L  Neurologic: AAOx3; answers questions appropriately, follows commands, moves all extremities, CNIII-XII grossly intact; no focal deficits, motor 5/5 in UE and LE, sensation in tact to light touch throughout      LABS:                        12.3   9.82  )-----------( 253      ( 31 Dec 2019 05:48 )             37.7     12-31    143  |  108  |  5   ----------------------------<  92  4.0   |  26  |  0.81    Ca    7.9      31 Dec 2019 05:48    TPro  6.7  /  Alb  3.1  /  TBili  0.3  /  DBili  x   /  AST  42  /  ALT  74  /  AlkPhos  94  12-31    eGFR if Non African American: 80 mL/min/1.73M2 (12-31-19 @ 05:48)  eGFR if : 92 mL/min/1.73M2 (12-31-19 @ 05:48)    PT/INR - ( 30 Dec 2019 13:23 )   PT: 12.6 sec;   INR: 1.12 ratio         PTT - ( 30 Dec 2019 13:23 )  PTT:32.0 sec    CARDIAC MARKERS ( 30 Dec 2019 13:23 )  <.017 ng/mL / x     / x     / x     / x          12-31 Chol 192 mg/dL  mg/dL HDL 61 mg/dL Trig 95 mg/dL              POCT Blood Glucose.: 148 mg/dL (30 Dec 2019 13:11)    12-31 [id number] 5.6          RADIOLOGY & ADDITIONAL TESTS:  < from: US Duplex Carotid Arteries Complete, Bilateral (12.30.19 @ 19:31) >  Impression: No hemodynamically significant stenosis in the left internal carotid artery by velocity criteria.    Elevated peak systolic velocity in the right internal carotid artery, compatible with a moderate (50-69%) stenosis by velocity criteria.                      CHAD ALBERT M.D., ATTENDING RADIOLOGIST  This document has been electronically signed. Dec 30 2019  7:48PM    < end of copied text >  < from: CT Brain Stroke Protocol (12.30.19 @ 13:36) >    Impression:  1. Unremarkable noncontrast CT scan of the brain.                        TRENTON HORNE M.D., ATTENDING RADIOLOGIST  This document has been electronically signed. Dec 30 2019  3:22PM    < end of copied text >        Care Discussed with Consultants/Other Providers:

## 2019-12-31 NOTE — PROGRESS NOTE ADULT - PROBLEM SELECTOR PLAN 1
neuro consult appreciated, clinically doubt TIA, initital CTH negative, Trops negative, carotids reveal moderate stenosis CINDY, follow up echo, repeat CTH

## 2019-12-31 NOTE — DISCHARGE NOTE PROVIDER - NSDCCPCAREPLAN_GEN_ALL_CORE_FT
PRINCIPAL DISCHARGE DIAGNOSIS  Diagnosis: TIA (transient ischemic attack)  Assessment and Plan of Treatment:       SECONDARY DISCHARGE DIAGNOSES  Diagnosis: Carboxyhemoglobinemia, accidental or unintentional, initial encounter  Assessment and Plan of Treatment: PRINCIPAL DISCHARGE DIAGNOSIS  Diagnosis: Altered mental state  Assessment and Plan of Treatment:       SECONDARY DISCHARGE DIAGNOSES  Diagnosis: Exposure to industrial fumes  Assessment and Plan of Treatment:     Diagnosis: Carboxyhemoglobinemia, accidental or unintentional, initial encounter  Assessment and Plan of Treatment:

## 2019-12-31 NOTE — DISCHARGE NOTE PROVIDER - NSDCMRMEDTOKEN_GEN_ALL_CORE_FT
aspirin 81 mg oral tablet, chewable: 1 tab(s) orally once a day  atorvastatin 80 mg oral tablet: 1 tab(s) orally once a day (at bedtime)

## 2019-12-31 NOTE — DISCHARGE NOTE NURSING/CASE MANAGEMENT/SOCIAL WORK - NSDCPEPTSTRK_GEN_ALL_CORE
Stroke warning signs and symptoms/Signs and symptoms of stroke/Prescribed medications/Call 911 for stroke/Stroke support groups for patients, families, and friends/Risk factors for stroke/Need for follow up after discharge/Stroke education booklet

## 2019-12-31 NOTE — DISCHARGE NOTE NURSING/CASE MANAGEMENT/SOCIAL WORK - PATIENT PORTAL LINK FT
You can access the FollowMyHealth Patient Portal offered by [facility] by registering at the following website: [email or web address]. By joining [facility]’s FollowMyHealth portal, you will also be able to view your health information using other applications (apps) compatible with our system.

## 2019-12-31 NOTE — DISCHARGE NOTE PROVIDER - HOSPITAL COURSE
59 female with hld p/w sudden onset "dream like" state, difficulty concentrating, nausea, tingling fingers admitted for concern over possible initial CTH unremarkable, carotids revealed no significant stenosis LICA, moderate stenosis (50-69%) CINDY, tte completed, neuro consult appreciated, clinically doubt TIA, symptoms resolved, clinically stable plan to discharge home with outpatient follow up. Attempted to reach out to Dr. Gallo PCP and was informed she is out of the     office until 1/2/2019, provided Dr. Avila contact info for follow up secondary to moderate carotid stenosis.

## 2019-12-31 NOTE — DISCHARGE NOTE PROVIDER - CARE PROVIDER_API CALL
Caroline Gallo)  Internal Medicine  [address] [place] [address]  Phone: [phone]  Fax: [phone]  Follow Up Time:     Nilay Avila)  Surgery  [address], [address]  [place], [place] [id number]  Phone: [phone]  Fax: [phone]  Follow Up Time:

## 2019-12-31 NOTE — PROGRESS NOTE ADULT - ASSESSMENT
59 female with hld presented with acute onset of "dreamlike" state, difficulty concentrating, nausea, tingling fingers concern for TIA

## 2020-06-24 PROBLEM — E78.5 HYPERLIPIDEMIA, UNSPECIFIED: Chronic | Status: ACTIVE | Noted: 2019-12-30

## 2020-07-09 ENCOUNTER — APPOINTMENT (OUTPATIENT)
Dept: ULTRASOUND IMAGING | Facility: HOSPITAL | Age: 60
End: 2020-07-09
Payer: COMMERCIAL

## 2020-07-09 ENCOUNTER — APPOINTMENT (OUTPATIENT)
Dept: MAMMOGRAPHY | Facility: HOSPITAL | Age: 60
End: 2020-07-09
Payer: COMMERCIAL

## 2020-07-09 ENCOUNTER — OUTPATIENT (OUTPATIENT)
Dept: OUTPATIENT SERVICES | Facility: HOSPITAL | Age: 60
LOS: 1 days | End: 2020-07-09
Payer: COMMERCIAL

## 2020-07-09 DIAGNOSIS — Z90.49 ACQUIRED ABSENCE OF OTHER SPECIFIED PARTS OF DIGESTIVE TRACT: Chronic | ICD-10-CM

## 2020-07-09 DIAGNOSIS — Z00.8 ENCOUNTER FOR OTHER GENERAL EXAMINATION: ICD-10-CM

## 2020-07-09 DIAGNOSIS — Z98.891 HISTORY OF UTERINE SCAR FROM PREVIOUS SURGERY: Chronic | ICD-10-CM

## 2020-07-09 PROCEDURE — 76641 ULTRASOUND BREAST COMPLETE: CPT | Mod: 26,50

## 2020-07-09 PROCEDURE — 77063 BREAST TOMOSYNTHESIS BI: CPT | Mod: 26

## 2020-07-09 PROCEDURE — 77067 SCR MAMMO BI INCL CAD: CPT | Mod: 26

## 2020-07-09 PROCEDURE — 76641 ULTRASOUND BREAST COMPLETE: CPT

## 2020-07-09 PROCEDURE — 77067 SCR MAMMO BI INCL CAD: CPT

## 2020-07-09 PROCEDURE — 77063 BREAST TOMOSYNTHESIS BI: CPT

## 2021-01-12 ENCOUNTER — TRANSCRIPTION ENCOUNTER (OUTPATIENT)
Age: 61
End: 2021-01-12

## 2021-07-21 ENCOUNTER — APPOINTMENT (OUTPATIENT)
Dept: ULTRASOUND IMAGING | Facility: HOSPITAL | Age: 61
End: 2021-07-21
Payer: COMMERCIAL

## 2021-07-21 ENCOUNTER — OUTPATIENT (OUTPATIENT)
Dept: OUTPATIENT SERVICES | Facility: HOSPITAL | Age: 61
LOS: 1 days | End: 2021-07-21
Payer: COMMERCIAL

## 2021-07-21 ENCOUNTER — APPOINTMENT (OUTPATIENT)
Dept: MAMMOGRAPHY | Facility: HOSPITAL | Age: 61
End: 2021-07-21
Payer: COMMERCIAL

## 2021-07-21 DIAGNOSIS — Z90.49 ACQUIRED ABSENCE OF OTHER SPECIFIED PARTS OF DIGESTIVE TRACT: Chronic | ICD-10-CM

## 2021-07-21 DIAGNOSIS — Z00.8 ENCOUNTER FOR OTHER GENERAL EXAMINATION: ICD-10-CM

## 2021-07-21 DIAGNOSIS — Z98.891 HISTORY OF UTERINE SCAR FROM PREVIOUS SURGERY: Chronic | ICD-10-CM

## 2021-07-21 PROCEDURE — 76641 ULTRASOUND BREAST COMPLETE: CPT | Mod: 26,50

## 2021-07-21 PROCEDURE — 76641 ULTRASOUND BREAST COMPLETE: CPT

## 2021-07-21 PROCEDURE — 77067 SCR MAMMO BI INCL CAD: CPT

## 2021-07-21 PROCEDURE — 77063 BREAST TOMOSYNTHESIS BI: CPT | Mod: 26

## 2021-07-21 PROCEDURE — 77067 SCR MAMMO BI INCL CAD: CPT | Mod: 26

## 2021-07-21 PROCEDURE — 77063 BREAST TOMOSYNTHESIS BI: CPT

## 2021-12-14 ENCOUNTER — TRANSCRIPTION ENCOUNTER (OUTPATIENT)
Age: 61
End: 2021-12-14

## 2022-04-26 ENCOUNTER — APPOINTMENT (OUTPATIENT)
Dept: MAMMOGRAPHY | Facility: CLINIC | Age: 62
End: 2022-04-26
Payer: COMMERCIAL

## 2022-04-26 ENCOUNTER — APPOINTMENT (OUTPATIENT)
Dept: ULTRASOUND IMAGING | Facility: CLINIC | Age: 62
End: 2022-04-26
Payer: COMMERCIAL

## 2022-04-26 PROCEDURE — G0279: CPT | Mod: RT

## 2022-04-26 PROCEDURE — 77065 DX MAMMO INCL CAD UNI: CPT | Mod: RT

## 2022-04-26 PROCEDURE — 76642 ULTRASOUND BREAST LIMITED: CPT | Mod: RT

## 2022-05-02 ENCOUNTER — NON-APPOINTMENT (OUTPATIENT)
Age: 62
End: 2022-05-02

## 2022-07-12 ENCOUNTER — APPOINTMENT (OUTPATIENT)
Dept: MRI IMAGING | Facility: CLINIC | Age: 62
End: 2022-07-12

## 2022-07-12 ENCOUNTER — APPOINTMENT (OUTPATIENT)
Dept: RADIOLOGY | Facility: CLINIC | Age: 62
End: 2022-07-12

## 2022-07-12 ENCOUNTER — OUTPATIENT (OUTPATIENT)
Dept: OUTPATIENT SERVICES | Facility: HOSPITAL | Age: 62
LOS: 1 days | End: 2022-07-12
Payer: COMMERCIAL

## 2022-07-12 DIAGNOSIS — Z98.891 HISTORY OF UTERINE SCAR FROM PREVIOUS SURGERY: Chronic | ICD-10-CM

## 2022-07-12 DIAGNOSIS — Z90.49 ACQUIRED ABSENCE OF OTHER SPECIFIED PARTS OF DIGESTIVE TRACT: Chronic | ICD-10-CM

## 2022-07-12 DIAGNOSIS — Z00.8 ENCOUNTER FOR OTHER GENERAL EXAMINATION: ICD-10-CM

## 2022-07-12 PROCEDURE — 77080 DXA BONE DENSITY AXIAL: CPT | Mod: 26

## 2022-07-12 PROCEDURE — 77080 DXA BONE DENSITY AXIAL: CPT

## 2022-07-12 PROCEDURE — C8937: CPT

## 2022-07-12 PROCEDURE — A9585: CPT

## 2022-07-12 PROCEDURE — 77049 MRI BREAST C-+ W/CAD BI: CPT | Mod: 26

## 2022-07-12 PROCEDURE — C8908: CPT

## 2022-08-16 ENCOUNTER — OUTPATIENT (OUTPATIENT)
Dept: OUTPATIENT SERVICES | Facility: HOSPITAL | Age: 62
LOS: 1 days | End: 2022-08-16
Payer: COMMERCIAL

## 2022-08-16 ENCOUNTER — APPOINTMENT (OUTPATIENT)
Dept: MAMMOGRAPHY | Facility: HOSPITAL | Age: 62
End: 2022-08-16

## 2022-08-16 ENCOUNTER — APPOINTMENT (OUTPATIENT)
Dept: ULTRASOUND IMAGING | Facility: HOSPITAL | Age: 62
End: 2022-08-16

## 2022-08-16 DIAGNOSIS — Z00.8 ENCOUNTER FOR OTHER GENERAL EXAMINATION: ICD-10-CM

## 2022-08-16 DIAGNOSIS — Z98.891 HISTORY OF UTERINE SCAR FROM PREVIOUS SURGERY: Chronic | ICD-10-CM

## 2022-08-16 DIAGNOSIS — Z90.49 ACQUIRED ABSENCE OF OTHER SPECIFIED PARTS OF DIGESTIVE TRACT: Chronic | ICD-10-CM

## 2022-08-16 PROCEDURE — 77067 SCR MAMMO BI INCL CAD: CPT

## 2022-08-16 PROCEDURE — 76641 ULTRASOUND BREAST COMPLETE: CPT | Mod: 26,50

## 2022-08-16 PROCEDURE — 76641 ULTRASOUND BREAST COMPLETE: CPT

## 2022-08-16 PROCEDURE — 77067 SCR MAMMO BI INCL CAD: CPT | Mod: 26

## 2022-08-16 PROCEDURE — 77063 BREAST TOMOSYNTHESIS BI: CPT | Mod: 26

## 2022-08-16 PROCEDURE — 77063 BREAST TOMOSYNTHESIS BI: CPT

## 2022-10-27 ENCOUNTER — NON-APPOINTMENT (OUTPATIENT)
Age: 62
End: 2022-10-27

## 2023-07-31 ENCOUNTER — APPOINTMENT (OUTPATIENT)
Dept: MRI IMAGING | Facility: CLINIC | Age: 63
End: 2023-07-31
Payer: COMMERCIAL

## 2023-07-31 ENCOUNTER — OUTPATIENT (OUTPATIENT)
Dept: OUTPATIENT SERVICES | Facility: HOSPITAL | Age: 63
LOS: 1 days | End: 2023-07-31
Payer: COMMERCIAL

## 2023-07-31 DIAGNOSIS — Z00.8 ENCOUNTER FOR OTHER GENERAL EXAMINATION: ICD-10-CM

## 2023-07-31 DIAGNOSIS — Z98.891 HISTORY OF UTERINE SCAR FROM PREVIOUS SURGERY: Chronic | ICD-10-CM

## 2023-07-31 DIAGNOSIS — N64.59 OTHER SIGNS AND SYMPTOMS IN BREAST: ICD-10-CM

## 2023-07-31 DIAGNOSIS — Z90.49 ACQUIRED ABSENCE OF OTHER SPECIFIED PARTS OF DIGESTIVE TRACT: Chronic | ICD-10-CM

## 2023-07-31 PROCEDURE — C8937: CPT

## 2023-07-31 PROCEDURE — C8908: CPT

## 2023-07-31 PROCEDURE — A9585: CPT

## 2023-07-31 PROCEDURE — 77049 MRI BREAST C-+ W/CAD BI: CPT | Mod: 26

## 2023-09-20 ENCOUNTER — OUTPATIENT (OUTPATIENT)
Dept: OUTPATIENT SERVICES | Facility: HOSPITAL | Age: 63
LOS: 1 days | End: 2023-09-20
Payer: COMMERCIAL

## 2023-09-20 ENCOUNTER — APPOINTMENT (OUTPATIENT)
Dept: MAMMOGRAPHY | Facility: HOSPITAL | Age: 63
End: 2023-09-20
Payer: COMMERCIAL

## 2023-09-20 ENCOUNTER — APPOINTMENT (OUTPATIENT)
Dept: ULTRASOUND IMAGING | Facility: HOSPITAL | Age: 63
End: 2023-09-20
Payer: COMMERCIAL

## 2023-09-20 DIAGNOSIS — Z90.49 ACQUIRED ABSENCE OF OTHER SPECIFIED PARTS OF DIGESTIVE TRACT: Chronic | ICD-10-CM

## 2023-09-20 DIAGNOSIS — Z98.891 HISTORY OF UTERINE SCAR FROM PREVIOUS SURGERY: Chronic | ICD-10-CM

## 2023-09-20 DIAGNOSIS — Z00.8 ENCOUNTER FOR OTHER GENERAL EXAMINATION: ICD-10-CM

## 2023-09-20 PROCEDURE — 77067 SCR MAMMO BI INCL CAD: CPT

## 2023-09-20 PROCEDURE — 76641 ULTRASOUND BREAST COMPLETE: CPT

## 2023-09-20 PROCEDURE — 76641 ULTRASOUND BREAST COMPLETE: CPT | Mod: 26,50

## 2023-09-20 PROCEDURE — 77063 BREAST TOMOSYNTHESIS BI: CPT | Mod: 26

## 2023-09-20 PROCEDURE — 77067 SCR MAMMO BI INCL CAD: CPT | Mod: 26

## 2023-09-20 PROCEDURE — 77063 BREAST TOMOSYNTHESIS BI: CPT

## 2024-04-02 ENCOUNTER — NON-APPOINTMENT (OUTPATIENT)
Age: 64
End: 2024-04-02

## 2024-07-03 ENCOUNTER — APPOINTMENT (OUTPATIENT)
Dept: OBGYN | Facility: CLINIC | Age: 64
End: 2024-07-03

## 2024-07-03 PROCEDURE — 99386 PREV VISIT NEW AGE 40-64: CPT

## 2024-07-03 PROCEDURE — 99459 PELVIC EXAMINATION: CPT

## 2024-09-06 ENCOUNTER — NON-APPOINTMENT (OUTPATIENT)
Age: 64
End: 2024-09-06

## 2024-09-24 ENCOUNTER — OUTPATIENT (OUTPATIENT)
Dept: OUTPATIENT SERVICES | Facility: HOSPITAL | Age: 64
LOS: 1 days | End: 2024-09-24
Payer: COMMERCIAL

## 2024-09-24 ENCOUNTER — APPOINTMENT (OUTPATIENT)
Dept: MAMMOGRAPHY | Facility: HOSPITAL | Age: 64
End: 2024-09-24
Payer: COMMERCIAL

## 2024-09-24 ENCOUNTER — APPOINTMENT (OUTPATIENT)
Dept: ULTRASOUND IMAGING | Facility: HOSPITAL | Age: 64
End: 2024-09-24
Payer: COMMERCIAL

## 2024-09-24 DIAGNOSIS — Z98.891 HISTORY OF UTERINE SCAR FROM PREVIOUS SURGERY: Chronic | ICD-10-CM

## 2024-09-24 DIAGNOSIS — Z90.49 ACQUIRED ABSENCE OF OTHER SPECIFIED PARTS OF DIGESTIVE TRACT: Chronic | ICD-10-CM

## 2024-09-24 DIAGNOSIS — Z00.8 ENCOUNTER FOR OTHER GENERAL EXAMINATION: ICD-10-CM

## 2024-09-24 PROCEDURE — 77063 BREAST TOMOSYNTHESIS BI: CPT | Mod: 26

## 2024-09-24 PROCEDURE — 77067 SCR MAMMO BI INCL CAD: CPT

## 2024-09-24 PROCEDURE — 77067 SCR MAMMO BI INCL CAD: CPT | Mod: 26

## 2024-09-24 PROCEDURE — 76641 ULTRASOUND BREAST COMPLETE: CPT | Mod: 26,50

## 2024-09-24 PROCEDURE — 77063 BREAST TOMOSYNTHESIS BI: CPT

## 2024-09-24 PROCEDURE — 76641 ULTRASOUND BREAST COMPLETE: CPT

## 2025-02-03 ENCOUNTER — OUTPATIENT (OUTPATIENT)
Dept: OUTPATIENT SERVICES | Facility: HOSPITAL | Age: 65
LOS: 1 days | End: 2025-02-03
Payer: MEDICARE

## 2025-02-03 ENCOUNTER — APPOINTMENT (OUTPATIENT)
Dept: MRI IMAGING | Facility: CLINIC | Age: 65
End: 2025-02-03
Payer: MEDICARE

## 2025-02-03 DIAGNOSIS — Z98.891 HISTORY OF UTERINE SCAR FROM PREVIOUS SURGERY: Chronic | ICD-10-CM

## 2025-02-03 DIAGNOSIS — Z90.49 ACQUIRED ABSENCE OF OTHER SPECIFIED PARTS OF DIGESTIVE TRACT: Chronic | ICD-10-CM

## 2025-02-03 DIAGNOSIS — Z00.8 ENCOUNTER FOR OTHER GENERAL EXAMINATION: ICD-10-CM

## 2025-02-03 PROCEDURE — C8937: CPT

## 2025-02-03 PROCEDURE — 77049 MRI BREAST C-+ W/CAD BI: CPT | Mod: 26

## 2025-02-03 PROCEDURE — C8908: CPT | Mod: MH

## 2025-02-03 PROCEDURE — A9585: CPT

## 2025-04-18 ENCOUNTER — APPOINTMENT (OUTPATIENT)
Dept: RADIOLOGY | Facility: HOSPITAL | Age: 65
End: 2025-04-18
Payer: COMMERCIAL

## 2025-04-18 ENCOUNTER — OUTPATIENT (OUTPATIENT)
Dept: OUTPATIENT SERVICES | Facility: HOSPITAL | Age: 65
LOS: 1 days | End: 2025-04-18
Payer: COMMERCIAL

## 2025-04-18 DIAGNOSIS — Z90.49 ACQUIRED ABSENCE OF OTHER SPECIFIED PARTS OF DIGESTIVE TRACT: Chronic | ICD-10-CM

## 2025-04-18 DIAGNOSIS — Z98.891 HISTORY OF UTERINE SCAR FROM PREVIOUS SURGERY: Chronic | ICD-10-CM

## 2025-04-18 DIAGNOSIS — Z00.8 ENCOUNTER FOR OTHER GENERAL EXAMINATION: ICD-10-CM

## 2025-04-18 PROCEDURE — 77080 DXA BONE DENSITY AXIAL: CPT | Mod: 26

## 2025-04-18 PROCEDURE — 77080 DXA BONE DENSITY AXIAL: CPT

## 2025-08-11 ENCOUNTER — APPOINTMENT (OUTPATIENT)
Dept: OBGYN | Facility: CLINIC | Age: 65
End: 2025-08-11
Payer: MEDICARE

## 2025-08-11 PROCEDURE — G0444 DEPRESSION SCREEN ANNUAL: CPT

## 2025-08-11 PROCEDURE — G0101: CPT | Mod: GA
